# Patient Record
Sex: FEMALE | Race: WHITE
[De-identification: names, ages, dates, MRNs, and addresses within clinical notes are randomized per-mention and may not be internally consistent; named-entity substitution may affect disease eponyms.]

---

## 2019-10-18 PROBLEM — Z00.00 ENCOUNTER FOR PREVENTIVE HEALTH EXAMINATION: Status: ACTIVE | Noted: 2019-10-18

## 2019-10-21 ENCOUNTER — APPOINTMENT (OUTPATIENT)
Dept: ORTHOPEDIC SURGERY | Facility: CLINIC | Age: 64
End: 2019-10-21
Payer: COMMERCIAL

## 2019-10-21 DIAGNOSIS — M17.10 UNILATERAL PRIMARY OSTEOARTHRITIS, UNSPECIFIED KNEE: ICD-10-CM

## 2019-10-21 DIAGNOSIS — S80.00XA CONTUSION OF UNSPECIFIED KNEE, INITIAL ENCOUNTER: ICD-10-CM

## 2019-10-21 PROCEDURE — 99204 OFFICE O/P NEW MOD 45 MIN: CPT

## 2019-10-21 NOTE — PHYSICAL EXAM
[de-identified] : Right knee\par \par Constitutional: \par The patient is healthy-appearing and in no apparent distress. \par \par Gait:\par The patient ambulates with a normal giat and use of a cane.\par \par Cardiovascular System: \par The capillary refill is less than 2 seconds. \par \par Skin: \par There are no skin abnormalities other than thickening of the lateral parapatellar soft tissue consistent with a resolving hematoma which is minimal in size as well as mild ecchymosis overlying the anterior aspect the knee which is resolving\par \par Right Knee: \par \par Bony Palpation: \par There is no tenderness of the medial joint line. \par There is no tenderness of the lateral joint line.\par There is no tenderness of the medial femoral chondyle.\par There is no tenderness of the lateral femoral chondyle.\par There is no tenderness of the tibial tubercle.\par There is no tenderness of the superior patella.\par There is no tenderness of the inferior patella.\par There is tenderness of the medial patellar facet.\par There is tenderness of the lateral patellar facet.\par \par Soft Tissue Palpation: \par There is no tenderness of the medial retinaculum.\par There is tenderness of the lateral retinaculum.\par There is no tenderness of the quadriceps tendon.\par There is no tenderness of the patella tendon.\par There is no tenderness of the ITB.\par There is no tenderness of the pes anserine.\par \par Active Range of Motion: \par The range of motion at the knee actively and passively is full. \par \par Special Tests: \par There is a negative Apley.\par There is a negative Steinmanns. \par There is a negative Lachman and Anterior Drawer.\par There is a negative Posterior Drawer.  \par There is no varus or valgus laxity.\par \par Strength: \par There is 4/5 hip flexion and 5/5 knee flexion and extension.  \par \par Psychiatric: \par The patient demonstrates a normal mood and affect and is active and alert.\par  [de-identified] : X-ray recommended for evaluation of the knee given the patient's previous diagnosis of arthritis as well as a recent trauma. Patient states he "had an x-ray in 7 weeks ago which not show any fracture" discussed at length with patient recommendation and need for evaluation given the trauma and no imaging available. Risk and benefits detail the patient expresses understanding but declines any imaging at this time

## 2019-10-21 NOTE — ASSESSMENT
[FreeTextEntry1] : Discussed at length with patient her exam and recommendations for continued physical therapy as well as observation. Patient informed the anterior contusion of the knee with some mild bruising can take up to 3 months sometimes longer to fully resolve.  Persistent discomfort patient followup in the office and at that time x-ray evaluation would be recommended or she may obtain her films from previous.\par \par
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
## 2024-09-05 ENCOUNTER — NON-APPOINTMENT (OUTPATIENT)
Age: 69
End: 2024-09-05

## 2024-09-05 ENCOUNTER — APPOINTMENT (OUTPATIENT)
Dept: OPHTHALMOLOGY | Facility: CLINIC | Age: 69
End: 2024-09-05
Payer: MEDICARE

## 2024-09-05 PROCEDURE — 92004 COMPRE OPH EXAM NEW PT 1/>: CPT

## 2024-09-05 PROCEDURE — 92250 FUNDUS PHOTOGRAPHY W/I&R: CPT

## 2024-09-05 PROCEDURE — 92083 EXTENDED VISUAL FIELD XM: CPT

## 2025-05-01 ENCOUNTER — APPOINTMENT (OUTPATIENT)
Dept: OPHTHALMOLOGY | Facility: CLINIC | Age: 70
End: 2025-05-01
Payer: MEDICARE

## 2025-05-01 ENCOUNTER — NON-APPOINTMENT (OUTPATIENT)
Age: 70
End: 2025-05-01

## 2025-05-01 PROCEDURE — 92134 CPTRZ OPH DX IMG PST SGM RTA: CPT

## 2025-05-01 PROCEDURE — 92202 OPSCPY EXTND ON/MAC DRAW: CPT

## 2025-05-01 PROCEDURE — 92014 COMPRE OPH EXAM EST PT 1/>: CPT

## 2025-05-01 PROCEDURE — 92083 EXTENDED VISUAL FIELD XM: CPT
